# Patient Record
Sex: MALE | Race: BLACK OR AFRICAN AMERICAN | NOT HISPANIC OR LATINO | Employment: UNEMPLOYED | URBAN - METROPOLITAN AREA
[De-identification: names, ages, dates, MRNs, and addresses within clinical notes are randomized per-mention and may not be internally consistent; named-entity substitution may affect disease eponyms.]

---

## 2017-01-17 ENCOUNTER — OFFICE VISIT (OUTPATIENT)
Dept: ALLERGY | Facility: CLINIC | Age: 22
End: 2017-01-17
Payer: COMMERCIAL

## 2017-01-17 VITALS
SYSTOLIC BLOOD PRESSURE: 96 MMHG | DIASTOLIC BLOOD PRESSURE: 60 MMHG | TEMPERATURE: 97 F | BODY MASS INDEX: 23.25 KG/M2 | RESPIRATION RATE: 15 BRPM | HEART RATE: 74 BPM | HEIGHT: 67 IN | WEIGHT: 148.13 LBS

## 2017-01-17 DIAGNOSIS — J30.89 ALLERGIC RHINITIS DUE TO OTHER ALLERGEN: ICD-10-CM

## 2017-01-17 DIAGNOSIS — H69.93 EUSTACHIAN TUBE DYSFUNCTION, BILATERAL: ICD-10-CM

## 2017-01-17 DIAGNOSIS — J30.1 NON-SEASONAL ALLERGIC RHINITIS DUE TO POLLEN: Primary | ICD-10-CM

## 2017-01-17 PROCEDURE — 99203 OFFICE O/P NEW LOW 30 MIN: CPT | Mod: S$GLB,,, | Performed by: ALLERGY & IMMUNOLOGY

## 2017-01-17 PROCEDURE — 99999 PR PBB SHADOW E&M-EST. PATIENT-LVL III: CPT | Mod: PBBFAC,,, | Performed by: ALLERGY & IMMUNOLOGY

## 2017-01-17 PROCEDURE — 1159F MED LIST DOCD IN RCRD: CPT | Mod: S$GLB,,, | Performed by: ALLERGY & IMMUNOLOGY

## 2017-01-17 RX ORDER — MONTELUKAST SODIUM 10 MG/1
TABLET ORAL
Qty: 30 TABLET | Refills: 3 | Status: SHIPPED | OUTPATIENT
Start: 2017-01-17 | End: 2017-04-03

## 2017-01-17 NOTE — PROGRESS NOTES
Chief complaint: ALLERGIES    This note was dictated using voice recognition software and may contain errors.    History:    He is 21 years old.  He stated he has a lifelong history of perennial nasal symptoms which worsen episodically throughout the year.  He stated that he does tend to be more symptomatic in the winter months.  He had a 9 AM appointment on Tuesday, January 17, 2017.    In years past ALLERGY tests were performed by Dr. Fiore.  He was told he exhibited positive skin tests for smoke, house dust mites, and grass pollen.  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  He stated he received allergen immunotherapy injections for approximately 18 months.  He perceived no improvement in symptoms as a result of receiving treatment.  As a result, he stopped receiving immunotherapy.    His most troublesome symptoms include posterior rhinorrhea and nasal obstruction.  He experiences itching of the pharynx.  He also experiences symptoms suggestive of eustachian tube dysfunction bilaterally.    He does not believe that he is ALLERGIC to any animals or foods.    Past medical history he has no history of asthma nasal fractures nose or sinus surgery.  He has no history of heart liver or kidney thyroid disease hypertension or diabetes.  Information in his medical record regarding his past medical history family history and social history was reviewed and updated today.  Significant additions if any are as noted above or below.    Family history is negative for rhinitis and asthma.    When well, he takes no medicines on a daily basis.    Social history he is a student at [employer].  He needs to complete 2 semesters of school to graduate.  He is a marketing major.  Currently he lives in an apartment.  No animals or there.  Occasionally people smoke in the apartment.  The apartment has central heating and cooling.    Review of systems: At the time of his  appointment this morning he is in no acute distress.  No additional symptoms are mentioned.  See above.  He did state in the past when he used nasal sprays he thought that the irritated his.  He does not believe that he is ever taken Singulair.    Exam:    In general he is in no distress.  He is alert oriented well-developed in good mood and attentive  Gait steady  Skin no rash noted  Head no swelling noted  Eyes sclera white conjunctiva pink  Nose patent no polyps seen clear secretions pink mucosa  Ears not inflamed tympanic membranes not inflamed  Mouth no swelling or inflammation of the lips tongue or in the throat noted  Neck no masses or thyromegaly noted  Lymph nodes no significant cervical or epitrochlear lymphadenopathy noted  Lungs clear to auscultation  Heart regular rhythm no murmurs heard  Extremities no swelling or inflammation of the hands or legs noted    Impression:    #1 ALLERGIC rhinitis  #2 eustachian tube dysfunction    Assessment and plan:    I suggested he consider evaluating Singulair 10 mg 1 pill once a day.  In the past he is not evaluated this medicine.  This is agreeable with him.  At his request a prescription was issued and was transmitted electronically to his pharmacy.  He will take 1 pill once a day in the evening.  Potential side effects were discussed.  I requested that he call in 3 or 4 weeks and inform me as to whether or not Singulair was helpful.  If it is not helpful I told him alternate suggestions regarding treatment may be considered.    I did not recommend that immediate hypersensitivity skin tests be repeated today.  Should he continue to be symptomatic despite the use of appropriate medications consideration may be given to repeat evaluation.  His last evaluation occurred about 6 years ago.    He was given the office phone number.  Should he have additional questions or concerns he was instructed to call.

## 2017-01-17 NOTE — MR AVS SNAPSHOT
[facility]  [address]  [place] [address]  Phone: [phone]  Fax: [phone]                  Eladio Boone   2017 9:00 AM   Office Visit    Description:  Male : 1995   Provider:  Yon Domingo MD   Department:  [facility]           Reason for Visit     Allergic Rhinitis            Diagnoses this Visit        Comments    Non-seasonal allergic rhinitis due to pollen    -  Primary     Allergic rhinitis due to other allergen         Eustachian tube dysfunction, bilateral                To Do List           Goals (5 Years of Data)     None       These Medications        Disp Refills Start End    montelukast (SINGULAIR) 10 mg tablet 30 tablet 3 2017     1 pill orally every evening    Pharmacy: [facility]/pharmacy #[id number]  [address] AT [facility] #: [phone]         [facility] On Call     [facility] On Call Nurse Care Line -  Assistance  Registered nurses in the [facility] On Call Center provide clinical advisement, health education, appointment booking, and other advisory services.  Call for this free service at [phone].             Medications           Message regarding Medications     Verify the changes and/or additions to your medication regime listed below are the same as discussed with your clinician today.  If any of these changes or additions are incorrect, please notify your healthcare provider.        START taking these NEW medications        Refills    montelukast (SINGULAIR) 10 mg tablet 3    Si pill orally every evening    Class: Normal           Verify that the below list of medications is an accurate representation of the medications you are currently taking.  If none reported, the list may be blank. If incorrect, please contact your healthcare provider. Carry this list with you in case of emergency.           Current Medications     montelukast (SINGULAIR) 10 mg tablet 1 pill orally every  "evening           Clinical Reference Information           Vital Signs - Last Recorded  Most recent update: 1/17/2017  9:22 AM by Suzan Sanchez, KYLEN    Temp Ht Wt BMI       97.2 °F (36.2 °C) 5' 6.5" (1.689 m) 67.2 kg (148 lb 2.4 oz) 23.55 kg/m2       Allergies as of 1/17/2017     No Known Allergies      Immunizations Administered on Date of Encounter - 1/17/2017     None      Instructions    Per discussion.       "

## 2017-04-03 ENCOUNTER — OFFICE VISIT (OUTPATIENT)
Dept: ALLERGY | Facility: CLINIC | Age: 22
End: 2017-04-03
Payer: COMMERCIAL

## 2017-04-03 VITALS
RESPIRATION RATE: 15 BRPM | HEIGHT: 67 IN | TEMPERATURE: 97 F | SYSTOLIC BLOOD PRESSURE: 96 MMHG | BODY MASS INDEX: 23.6 KG/M2 | DIASTOLIC BLOOD PRESSURE: 60 MMHG | HEART RATE: 72 BPM | WEIGHT: 150.38 LBS

## 2017-04-03 DIAGNOSIS — J30.89 ALLERGIC RHINITIS DUE TO OTHER ALLERGEN: Primary | ICD-10-CM

## 2017-04-03 DIAGNOSIS — J30.1 NON-SEASONAL ALLERGIC RHINITIS DUE TO POLLEN: ICD-10-CM

## 2017-04-03 PROCEDURE — 99214 OFFICE O/P EST MOD 30 MIN: CPT | Mod: S$GLB,,, | Performed by: ALLERGY & IMMUNOLOGY

## 2017-04-03 PROCEDURE — 1160F RVW MEDS BY RX/DR IN RCRD: CPT | Mod: S$GLB,,, | Performed by: ALLERGY & IMMUNOLOGY

## 2017-04-03 PROCEDURE — 99999 PR PBB SHADOW E&M-EST. PATIENT-LVL III: CPT | Mod: PBBFAC,,, | Performed by: ALLERGY & IMMUNOLOGY

## 2017-04-03 RX ORDER — IPRATROPIUM BROMIDE 21 UG/1
SPRAY, METERED NASAL
Qty: 30 ML | Refills: 2 | Status: SHIPPED | OUTPATIENT
Start: 2017-04-03 | End: 2017-07-05

## 2017-04-03 NOTE — MR AVS SNAPSHOT
[facility]  [address] [place] [address]  Phone: [phone]  Fax: [phone]                  Eladio Boone   4/3/2017 9:00 AM   Office Visit    Description:  Male : 1995   Provider:  Yon Domingo MD   Department:  [facility]           Reason for Visit     Other     Allergic Rhinitis            Diagnoses this Visit        Comments    Allergic rhinitis due to other allergen    -  Primary     Non-seasonal allergic rhinitis due to pollen                To Do List           Goals (5 Years of Data)     None       These Medications        Disp Refills Start End    ipratropium (ATROVENT) 0.03 % nasal spray 30 mL 2 4/3/2017     1 or 2 sprays each nasal passage every 8 hours as directed, if needed    Pharmacy: [facility]/pharmacy #[id number] - [place], [place] [id number] [facility] #: [phone]         [facility] On Call     [facility] On Call Nurse Care Line -  Assistance  Unless otherwise directed by your provider, please contact [facility], our nurse care line that is available for  assistance.     Registered nurses in the [facility] On Call Center provide: appointment scheduling, clinical advisement, health education, and other advisory services.  Call: [phone] (toll free)               Medications           Message regarding Medications     Verify the changes and/or additions to your medication regime listed below are the same as discussed with your clinician today.  If any of these changes or additions are incorrect, please notify your healthcare provider.        START taking these NEW medications        Refills    ipratropium (ATROVENT) 0.03 % nasal spray 2    Si or 2 sprays each nasal passage every 8 hours as directed, if needed    Class: Normal           Verify that the below list of medications is an accurate representation of the medications you are currently taking.  If none reported, the list may be blank. If  "incorrect, please contact your healthcare provider. Carry this list with you in case of emergency.           Current Medications     ipratropium (ATROVENT) 0.03 % nasal spray 1 or 2 sprays each nasal passage every 8 hours as directed, if needed    montelukast (SINGULAIR) 10 mg tablet 1 pill orally every evening           Clinical Reference Information           Your Vitals Were     BP Pulse Temp Resp Height Weight    96/60 72 97.4 °F (36.3 °C) 15 5' 6.75" (1.695 m) 68.2 kg (150 lb 5.7 oz)    BMI                23.73 kg/m2          Blood Pressure          Most Recent Value    BP  96/60      Allergies as of 4/3/2017     No Known Allergies      Immunizations Administered on Date of Encounter - 4/3/2017     None      Instructions    Per discussion.       Language Assistance Services     ATTENTION: Language assistance services are available, free of charge. Please call [phone].      ATENCIÓN: Si habla español, tiene a rebolledo disposición servicios gratuitos de asistencia lingüística. Llame al [phone].     CHÚ Ý: N?u b?n nói Ti?ng Vi?t, có các d?ch v? h? tr? ngôn ng? mi?n phí dành cho b?n. G?i s? [phone].         [facility] complies with applicable Federal civil rights laws and does not discriminate on the basis of race, color, national origin, age, disability, or sex.        "

## 2017-04-03 NOTE — PROGRESS NOTES
Chief complaint: Respiratory symptoms, reevaluation.    This note was dictated using voice recognition software may contain errors.    History:    Please read and refer to my note dated January 17, 2017.  Information in this note was read and reviewed and updated today.  Significant additions if any are as noted below.    He did not find the use of Singulair to be helpful.    GE reflux is not a problem for him.  He stated that is most troublesome symptom is that of posterior rhinorrhea.    In the past he has not evaluated Atrovent nasal spray.    Information in his medical record regarding his past medical history family history and social history was reviewed and updated today.  Significant additions if any are as noted above or below.    Review of systems: He does not smoke.  He stated in recent weeks no one is been smoking indoors at his residence.  Posterior rhinorrhea is is most troublesome symptom.  He complained of the sensation of some irritation affecting the right side of the pharynx.    Exam:    In general he is in no distress.  He is alert oriented well-developed in good mood and attentive  Gait steady  Head no swelling noted  Skin no rash noted  Eyes sclera white conjunctiva pink  Nose patent no polyps seen  Ears not inflamed tympanic membranes not inflamed  Neck no thyromegaly or masses noted  Lymph nodes no significant cervical or epitrochlear lymphadenopathy noted  Lungs clear to auscultation  Heart no murmurs heard regular rhythm  Extremities no swelling or inflammation of the hands or legs noted.  Mouth no swelling or inflammation of the lips tongue or in throat noted.  No vesicles noted.  No exudate noted no thrush noted    Impression:    #1 ALLERGIC rhinitis  #2 chronic rhinitis  #3 other health concerns as noted in his medical record.  #4 throat discomfort    Assessment and plan:    Should his throat continued to be symptomatic I recommended he obtain ENT consultation for evaluation and  examination.    In view of the fact that posterior rhinorrhea is is most troublesome symptom I suggested he evaluate the use of Atrovent nasal spray 0.03%.  This is agreeable with him.  At his request a prescription was issued for this medication and was transmitted electronically to his pharmacy.    Using anatomical teaching models of the nose and had I reviewed anatomy with him.  He was instructed in the proper technique for using a nasal spray.    I suggested that he use Atrovent nasal spray 0.03% or 2 sprays in each nasal passage as often as every 8 hours if needed.    If Atrovent is not found to be helpful consideration may be given to recommending evaluation of Astelin nasal spray.    At this time I did not recommend that immediate hypersensitivity skin tests be repeated.    He was given the office phone number.  Should he have additional questions or concerns he was instructed to call.    His appointment was 25 minutes in duration spent entirely in face-to-face contact.  More than 50% of the visit was spent in counseling and coordination of care.

## 2017-07-05 ENCOUNTER — OFFICE VISIT (OUTPATIENT)
Dept: OTOLARYNGOLOGY | Facility: CLINIC | Age: 22
End: 2017-07-05
Payer: COMMERCIAL

## 2017-07-05 VITALS
RESPIRATION RATE: 18 BRPM | BODY MASS INDEX: 23.24 KG/M2 | HEIGHT: 66 IN | SYSTOLIC BLOOD PRESSURE: 114 MMHG | WEIGHT: 144.63 LBS | TEMPERATURE: 98 F | DIASTOLIC BLOOD PRESSURE: 78 MMHG

## 2017-07-05 DIAGNOSIS — J30.9 ALLERGIC RHINITIS, UNSPECIFIED ALLERGIC RHINITIS TRIGGER, UNSPECIFIED RHINITIS SEASONALITY: Primary | ICD-10-CM

## 2017-07-05 PROCEDURE — 99204 OFFICE O/P NEW MOD 45 MIN: CPT | Mod: S$GLB,,, | Performed by: PHYSICIAN ASSISTANT

## 2017-07-05 PROCEDURE — 99999 PR PBB SHADOW E&M-EST. PATIENT-LVL III: CPT | Mod: PBBFAC,,, | Performed by: PHYSICIAN ASSISTANT

## 2017-07-05 RX ORDER — AZELASTINE HYDROCHLORIDE, FLUTICASONE PROPIONATE 137; 50 UG/1; UG/1
1 SPRAY, METERED NASAL DAILY
Qty: 23 G | Refills: 12 | Status: SHIPPED | OUTPATIENT
Start: 2017-07-05 | End: 2018-07-05

## 2017-07-05 NOTE — PROGRESS NOTES
Subjective:       Patient ID: Eladio Boone is a 21 y.o. male.    Chief Complaint: Sore Throat; Cough; and Nasal Congestion    Patient is a very pleasant 21 year old male here to see me today for the first time for evaluation of postnasal drainage and sore throat.  He reports issues with allergies for many years.  He's had previous allergy testing and was on SCIT for about 18 months in 2012.  He reports no significant improvement in his allergy symptoms with SCIT therefore he stopped it.  Since then, he's not been on consistent allergy medication.  He reports winter usually seems worse for him.  His allergy triggers are dust, pollen and grass.  He reports postnasal drainage and throat irritation for the past 2-3 months.  At times he coughs up clear-yellowish mucus.  His throat and neck seems swollen at times.  He denies heartburn or reflux.  He's not blowing his nose much.  Denies frequent sneezing.  He occasionally has itchy, watery eyes.  He's not currently using any nasal sprays.  He has recently seen Dr. Domingo but says he did not try Atrovent nasal spray as prescribed and he saw no improvement with Singulair.  He does not smoke; quit about 6 months ago; smoked less than 1 pack per day for about 3 years.  He has a dog and there's carpet in his apartment.  Denies previous sinus surgery.  Denies fever.  Denies dysphagia with solids or liquids.      Review of Systems   Constitutional: Negative for activity change, appetite change and fever.   HENT: Positive for congestion (sometimes), postnasal drip and sore throat. Negative for ear discharge, ear pain, hearing loss, nosebleeds, rhinorrhea, sinus pressure, tinnitus, trouble swallowing and voice change.    Eyes: Positive for itching (occasional). Negative for discharge.   Respiratory: Positive for cough. Negative for shortness of breath and wheezing.    Cardiovascular: Negative for chest pain and palpitations.   Gastrointestinal: Positive for nausea (this AM).  Negative for diarrhea and vomiting.   Musculoskeletal: Negative for arthralgias and neck pain.   Allergic/Immunologic: Positive for environmental allergies. Negative for food allergies.   Neurological: Positive for headaches (occasional). Negative for dizziness and light-headedness.   Hematological: Negative for adenopathy.   Psychiatric/Behavioral: Negative for sleep disturbance.       Objective:      Physical Exam   Constitutional: He is oriented to person, place, and time. He appears well-developed and well-nourished. He is cooperative.   HENT:   Head: Normocephalic and atraumatic.   Right Ear: Hearing, tympanic membrane, external ear and ear canal normal. No middle ear effusion.   Left Ear: Hearing, tympanic membrane, external ear and ear canal normal.  No middle ear effusion.   Nose: Mucosal edema (mild) and septal deviation (left) present. No rhinorrhea or nasal deformity. No epistaxis. Right sinus exhibits no maxillary sinus tenderness and no frontal sinus tenderness. Left sinus exhibits no maxillary sinus tenderness and no frontal sinus tenderness.   Mouth/Throat: Uvula is midline, oropharynx is clear and moist and mucous membranes are normal. Mucous membranes are not pale and not dry. No trismus in the jaw. No uvula swelling or dental caries. No oropharyngeal exudate (clear postnasal drainage present), posterior oropharyngeal edema or posterior oropharyngeal erythema. Tonsils are 2+ on the right. Tonsils are 2+ on the left. No tonsillar exudate.   Eyes: Conjunctivae, EOM and lids are normal. Pupils are equal, round, and reactive to light. Right eye exhibits no chemosis. Left eye exhibits no chemosis. Right conjunctiva is not injected. Left conjunctiva is not injected. No scleral icterus. Right eye exhibits normal extraocular motion and no nystagmus. Left eye exhibits normal extraocular motion and no nystagmus.   Neck: Trachea normal and phonation normal. No tracheal tenderness present. No tracheal deviation  present. No thyroid mass and no thyromegaly present.   Cardiovascular: Intact distal pulses.    Pulmonary/Chest: Effort normal. No stridor. No respiratory distress.   Abdominal: He exhibits no distension.   Lymphadenopathy:        Head (right side): No submental, no submandibular, no preauricular and no posterior auricular adenopathy present.        Head (left side): No submental, no submandibular, no preauricular and no posterior auricular adenopathy present.     He has no cervical adenopathy.   Neurological: He is alert and oriented to person, place, and time. No cranial nerve deficit.   Skin: Skin is warm and dry. No rash noted. No erythema.   Psychiatric: He has a normal mood and affect. His speech is normal and behavior is normal.   Vitals reviewed.      Assessment:       1. Allergic rhinitis, unspecified allergic rhinitis trigger, unspecified rhinitis seasonality        Plan:         Recommend he restart either Zyrtec or Claritin on a daily basis.   He's tried Flonase in the past without significant benefit.   The patient was given a prescription for an antihistamine/steroid nasal spray (Dymista), and we discussed in detail the proper mechanism of use directing the spray away from the nasal septum.  In addition, we also discussed that it will take two to three weeks of daily use to achieve maximal effectiveness.  The patient will please call in 2-3 weeks with their progress.  If allergy symptoms persist at that time, we could consider additional allergy testing.  I reassured patient that his throat does not look infectious to me today and I do not palpate any cervical lymphadenopathy.  No need for antibiotics at this time.

## 2019-02-20 ENCOUNTER — OFFICE VISIT (OUTPATIENT)
Dept: PODIATRY | Facility: CLINIC | Age: 24
End: 2019-02-20
Payer: COMMERCIAL

## 2019-02-20 VITALS
WEIGHT: 166.56 LBS | DIASTOLIC BLOOD PRESSURE: 73 MMHG | HEART RATE: 74 BPM | BODY MASS INDEX: 26.88 KG/M2 | SYSTOLIC BLOOD PRESSURE: 139 MMHG

## 2019-02-20 DIAGNOSIS — B35.3 TINEA PEDIS OF LEFT FOOT: Primary | ICD-10-CM

## 2019-02-20 PROCEDURE — 99203 OFFICE O/P NEW LOW 30 MIN: CPT | Mod: S$GLB,,, | Performed by: PODIATRIST

## 2019-02-20 PROCEDURE — 99999 PR PBB SHADOW E&M-EST. PATIENT-LVL III: CPT | Mod: PBBFAC,,, | Performed by: PODIATRIST

## 2019-02-20 PROCEDURE — 3008F PR BODY MASS INDEX (BMI) DOCUMENTED: ICD-10-PCS | Mod: CPTII,S$GLB,, | Performed by: PODIATRIST

## 2019-02-20 PROCEDURE — 99999 PR PBB SHADOW E&M-EST. PATIENT-LVL III: ICD-10-PCS | Mod: PBBFAC,,, | Performed by: PODIATRIST

## 2019-02-20 PROCEDURE — 3008F BODY MASS INDEX DOCD: CPT | Mod: CPTII,S$GLB,, | Performed by: PODIATRIST

## 2019-02-20 PROCEDURE — 99203 PR OFFICE/OUTPT VISIT, NEW, LEVL III, 30-44 MIN: ICD-10-PCS | Mod: S$GLB,,, | Performed by: PODIATRIST

## 2019-02-20 RX ORDER — NAFTIFINE HYDROCHLORIDE 20 MG/G
GEL TOPICAL
Qty: 60 G | Refills: 2 | Status: SHIPPED | OUTPATIENT
Start: 2019-02-20 | End: 2019-03-22

## 2019-02-20 NOTE — PROGRESS NOTES
Subjective:       Patient ID: Eladio Boone is a 23 y.o. male.    Chief Complaint: Sore (sore on left foot; 4/10 pain level; no special footware; amulation with no assistance; non-diabetic; no PCP)    HPI: Eladio Boone presents to the clinic today with the chief complaint of persistent pruritus and burning to the left foot. Patient states these symptoms have been on going for several weeks to a month or so. Patient has tried OTC topical medications. States no prior evaluation by MD/DO/DPM. 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 States malodor to the foot at days end, and/or after wearing socks/shoes/sneakers/boots for several hours. States red, dry and flaky skin as well.     Review of patient's allergies indicates:  No Known Allergies    History reviewed. No pertinent past medical history.    Family History   Problem Relation Age of Onset    No Known Problems Mother     Hypertension Father     Colon cancer Neg Hx        Social History     Socioeconomic History    Marital status: Single     Spouse name: Not on file    Number of children: Not on file    Years of education: Not on file    Highest education level: Not on file   Social Needs    Financial resource strain: Not on file    Food insecurity - worry: Not on file    Food insecurity - inability: Not on file    Transportation needs - medical: Not on file    Transportation needs - non-medical: Not on file   Occupational History    Not on file   Tobacco Use    Smoking status: Former Smoker     Types: Cigarettes     Last attempt to quit: 2017     Years since quittin.1   Substance and Sexual Activity    Alcohol use: Yes     Comment: social use    Drug use: No    Sexual activity: Not on file   Other Topics Concern    Not on file   Social History Narrative    Single       Past Surgical History:   Procedure Laterality Date    WISDOM TOOTH EXTRACTION         Review of Systems   Constitutional: Negative for chills, fatigue and fever.    HENT: Negative for hearing loss.    Eyes: Negative for photophobia and visual disturbance.   Respiratory: Negative for cough, chest tightness, shortness of breath and wheezing.    Cardiovascular: Negative for chest pain and palpitations.   Gastrointestinal: Negative for constipation, diarrhea, nausea and vomiting.   Endocrine: Negative for cold intolerance and heat intolerance.   Genitourinary: Negative for flank pain.   Musculoskeletal: Negative for neck pain and neck stiffness.   Skin: Positive for rash and wound.   Neurological: Negative for light-headedness and headaches.   Psychiatric/Behavioral: Negative for sleep disturbance.          Objective:   /73   Pulse 74   Wt 75.5 kg (166 lb 8.9 oz)   BMI 26.88 kg/m²     X-Ray Abdomen Flat And Erect  Narrative: Comparison: None     Two views, 4 images    Findings:    Moderate air and feces within nondistended colon and rectum.  No abnormally distended air-filled bowel loop or free air. Lung bases are clear.  Minimal rotary scoliosis the otherwise unremarkable L. spine.  Focal calcification within the inferior aspect   right hemipelvis.  Impression:       1.  Nonspecific bowel pattern.  See above.    2.  Clinical and laboratory findings should guide further evaluation and/or follow-up imaging.       Electronically signed by: SHERMAN DOLL III, MD  Date:     01/28/15  Time:    13:58        Physical Exam    LOWER EXTREMITY PHYSICAL EXAMINATION  DERMATOLOGY:  Substantial flaky, erythematous, xerotic like lesions are noted to the left 4th webspace and plantar foot and digital sulcus.  As per the patient, the aforementioned areas are moderately pruritic.     NEUROLOGY: Sensation to light touch is intact. Proprioception is intact.    ORTHOPEDIC: Manual Muscle Testing is 5/5 in all planes on the left and right, without pains, with and without resistance. Gait pattern is non-antalgic.    VASCULAR: Pulses are palpable to the B/L lower extremity. The right dorsalis  pedis pulse is 2/4 and the posterior tibial pulse is 2/4. The left dorsalis pedis pulse is 2/4 and the posterior tibial pulse is 2/4. Hair growth is noted on the dorsal foot and digits. Proximal to distal, warm to warm. Capillary refill time is WNL at less than 3s.      Assessment:     1. Tinea pedis of left foot        Plan:     Tinea pedis of left foot    Other orders  -     naftifine 2 % Gel; Applied topically daily.  Dispense: 60 g; Refill: 2      Recommend moisture wicking socks to alleviate the hyperhidrosis which makes one prone to recurrent tinea pedis.  I also recommend to alternate shoe gear, meaning, Monday, Wednesday, Friday, Saturday/Sunday and Tuesday, Thursday, Saturday/Sunday.  I do also recommend Tinactin antifungal spray to shoes daily.  After the aforementioned, put the shoes aside and allow to dry overnight and/or one full day.  Patient may purchase OTC Gold Bond Powder and use to his/her shoes daily prior to putting them on. He/She may also sprinkle a slight amount of powder to the foot prior to putting on socks.        No future appointments.

## 2019-09-17 ENCOUNTER — HOSPITAL ENCOUNTER (OUTPATIENT)
Dept: RADIOLOGY | Facility: HOSPITAL | Age: 24
Discharge: HOME | End: 2019-09-17
Attending: INTERNAL MEDICINE
Payer: COMMERCIAL

## 2019-09-17 ENCOUNTER — OFFICE VISIT (OUTPATIENT)
Dept: INTERNAL MEDICINE | Facility: CLINIC | Age: 24
End: 2019-09-17
Payer: COMMERCIAL

## 2019-09-17 VITALS
RESPIRATION RATE: 18 BRPM | SYSTOLIC BLOOD PRESSURE: 130 MMHG | HEART RATE: 79 BPM | BODY MASS INDEX: 27.33 KG/M2 | WEIGHT: 169.31 LBS | DIASTOLIC BLOOD PRESSURE: 78 MMHG | TEMPERATURE: 96 F | OXYGEN SATURATION: 97 %

## 2019-09-17 DIAGNOSIS — M54.2 NECK PAIN: ICD-10-CM

## 2019-09-17 DIAGNOSIS — R53.83 FATIGUE, UNSPECIFIED TYPE: ICD-10-CM

## 2019-09-17 DIAGNOSIS — R20.2 ARM PARESTHESIA, LEFT: Primary | ICD-10-CM

## 2019-09-17 DIAGNOSIS — Z13.220 SCREENING FOR HYPERLIPIDEMIA: ICD-10-CM

## 2019-09-17 PROCEDURE — 3008F BODY MASS INDEX DOCD: CPT | Mod: CPTII,S$GLB,, | Performed by: INTERNAL MEDICINE

## 2019-09-17 PROCEDURE — 72040 XR CERVICAL SPINE AP LATERAL: ICD-10-PCS | Mod: 26,,, | Performed by: RADIOLOGY

## 2019-09-17 PROCEDURE — 99204 PR OFFICE/OUTPT VISIT, NEW, LEVL IV, 45-59 MIN: ICD-10-PCS | Mod: S$GLB,,, | Performed by: INTERNAL MEDICINE

## 2019-09-17 PROCEDURE — 72040 X-RAY EXAM NECK SPINE 2-3 VW: CPT | Mod: TC

## 2019-09-17 PROCEDURE — 3008F PR BODY MASS INDEX (BMI) DOCUMENTED: ICD-10-PCS | Mod: CPTII,S$GLB,, | Performed by: INTERNAL MEDICINE

## 2019-09-17 PROCEDURE — 99204 OFFICE O/P NEW MOD 45 MIN: CPT | Mod: S$GLB,,, | Performed by: INTERNAL MEDICINE

## 2019-09-17 PROCEDURE — 99999 PR PBB SHADOW E&M-EST. PATIENT-LVL III: ICD-10-PCS | Mod: PBBFAC,,, | Performed by: INTERNAL MEDICINE

## 2019-09-17 PROCEDURE — 72040 X-RAY EXAM NECK SPINE 2-3 VW: CPT | Mod: 26,,, | Performed by: RADIOLOGY

## 2019-09-17 PROCEDURE — 99999 PR PBB SHADOW E&M-EST. PATIENT-LVL III: CPT | Mod: PBBFAC,,, | Performed by: INTERNAL MEDICINE

## 2019-09-18 ENCOUNTER — LAB VISIT (OUTPATIENT)
Dept: LAB | Facility: HOSPITAL | Age: 24
End: 2019-09-18
Payer: COMMERCIAL

## 2019-09-18 DIAGNOSIS — R20.2 ARM PARESTHESIA, LEFT: ICD-10-CM

## 2019-09-18 DIAGNOSIS — R53.83 FATIGUE, UNSPECIFIED TYPE: ICD-10-CM

## 2019-09-18 DIAGNOSIS — Z13.220 SCREENING FOR HYPERLIPIDEMIA: ICD-10-CM

## 2019-09-18 LAB
BASOPHILS # BLD AUTO: 0.05 K/UL (ref 0–0.2)
BASOPHILS NFR BLD: 0.9 % (ref 0–1.9)
DIFFERENTIAL METHOD: NORMAL
EOSINOPHIL # BLD AUTO: 0.1 K/UL (ref 0–0.5)
EOSINOPHIL NFR BLD: 1.4 % (ref 0–8)
ERYTHROCYTE [DISTWIDTH] IN BLOOD BY AUTOMATED COUNT: 13.2 % (ref 11.5–14.5)
HCT VFR BLD AUTO: 46.6 % (ref 40–54)
HGB BLD-MCNC: 15.2 G/DL (ref 14–18)
IMM GRANULOCYTES # BLD AUTO: 0.01 K/UL (ref 0–0.04)
IMM GRANULOCYTES NFR BLD AUTO: 0.2 % (ref 0–0.5)
LYMPHOCYTES # BLD AUTO: 2.7 K/UL (ref 1–4.8)
LYMPHOCYTES NFR BLD: 47.6 % (ref 18–48)
MCH RBC QN AUTO: 28 PG (ref 27–31)
MCHC RBC AUTO-ENTMCNC: 32.6 G/DL (ref 32–36)
MCV RBC AUTO: 86 FL (ref 82–98)
MONOCYTES # BLD AUTO: 0.6 K/UL (ref 0.3–1)
MONOCYTES NFR BLD: 10.9 % (ref 4–15)
NEUTROPHILS # BLD AUTO: 2.2 K/UL (ref 1.8–7.7)
NEUTROPHILS NFR BLD: 39 % (ref 38–73)
NRBC BLD-RTO: 0 /100 WBC
PLATELET # BLD AUTO: 245 K/UL (ref 150–350)
PMV BLD AUTO: 10.4 FL (ref 9.2–12.9)
RBC # BLD AUTO: 5.42 M/UL (ref 4.6–6.2)
WBC # BLD AUTO: 5.71 K/UL (ref 3.9–12.7)

## 2019-09-18 PROCEDURE — 85025 COMPLETE CBC W/AUTO DIFF WBC: CPT

## 2019-09-18 PROCEDURE — 82607 VITAMIN B-12: CPT

## 2019-09-18 PROCEDURE — 84443 ASSAY THYROID STIM HORMONE: CPT

## 2019-09-18 PROCEDURE — 36415 COLL VENOUS BLD VENIPUNCTURE: CPT

## 2019-09-18 PROCEDURE — 80061 LIPID PANEL: CPT

## 2019-09-18 PROCEDURE — 80053 COMPREHEN METABOLIC PANEL: CPT

## 2019-09-18 NOTE — PROGRESS NOTES
Subjective:       Patient ID: Eladio Boone is a 23 y.o. male.    Chief Complaint: Numbness (left arm/chest pain/neck pain)    23 y.o. Black or  male     Patient presents with:  Numbness: left arm/chest pain/neck pain    HPI: Presents to the clinic with compliant of occasional left arm numbness for more than a year. He does report neck pain that has been going on for a long time. The pain occasionally goes into his left upper chest. He feels stiff. He does feel he has anxiety at times and that makes the symptoms worse. He has trouble sleeping at times due to his symptoms.   He has also been experiencing a lot of fatigue.   He denies a significant past medical history and he is not on any medications.     History reviewed. No pertinent past medical history.    Past Surgical History:  WISDOM TOOTH EXTRACTION    Review of patient's family history indicates:  Problem: No Known Problems      Relation: Mother          Age of Onset: (Not Specified)  Problem: Hypertension      Relation: Father          Age of Onset: (Not Specified)  Problem: Stroke      Relation: Maternal Grandfather          Age of Onset: (Not Specified)  Problem: Colon cancer      Relation: Neg Hx          Age of Onset: (Not Specified)    No current outpatient medications on file prior to visit.  No current facility-administered medications on file prior to visit.     Allergies:   Review of patient's allergies indicates:   -- Pollen extracts             Review of Systems   Constitutional: Positive for fatigue. Negative for fever and unexpected weight change.   Eyes: Negative for visual disturbance.   Respiratory: Negative for cough and shortness of breath.    Cardiovascular: Positive for chest pain. Negative for leg swelling.   Gastrointestinal: Negative for abdominal pain.   Genitourinary: Negative for difficulty urinating.   Musculoskeletal: Positive for arthralgias, back pain and neck pain. Negative for gait problem.   Neurological:  Positive for weakness and numbness. Negative for dizziness, syncope and headaches.   Psychiatric/Behavioral: Positive for sleep disturbance. The patient is nervous/anxious.        Objective:      Physical Exam   Constitutional: He is oriented to person, place, and time. He appears well-developed and well-nourished. No distress.   Eyes: Pupils are equal, round, and reactive to light. EOM are normal. No scleral icterus.   Neck: No spinous process tenderness and no muscular tenderness present. No neck rigidity. No tracheal deviation, no edema, no erythema and normal range of motion present. No thyromegaly present.   Cardiovascular: Normal rate, regular rhythm and normal heart sounds.   Pulmonary/Chest: Effort normal and breath sounds normal. No respiratory distress. He has no wheezes. He has no rales.   Abdominal: Soft. Bowel sounds are normal.   Musculoskeletal: He exhibits no edema.   Lymphadenopathy:     He has no cervical adenopathy.   Neurological: He is alert and oriented to person, place, and time. He has normal strength. He displays no atrophy. A sensory deficit (decreased in LUE) is present. He exhibits normal muscle tone. Gait normal.   Vitals reviewed.      Assessment:       1. Arm paresthesia, left    2. Neck pain    3. Fatigue, unspecified type    4. Screening for hyperlipidemia        Plan:       Eladio was seen today for numbness.    Diagnoses and all orders for this visit:    Arm paresthesia, left  -     CBC auto differential; Future  -     Comprehensive metabolic panel; Future  -     Vitamin B12; Future    Neck pain  -     X-Ray Cervical Spine AP And Lateral; Future    Fatigue, unspecified type  -     CBC auto differential; Future  -     Comprehensive metabolic panel; Future  -     TSH; Future    Screening for hyperlipidemia  -     Lipid panel; Future    X-ray today.     Schedule labs.

## 2019-09-19 LAB
ALBUMIN SERPL BCP-MCNC: 4.4 G/DL (ref 3.5–5.2)
ALP SERPL-CCNC: 94 U/L (ref 55–135)
ALT SERPL W/O P-5'-P-CCNC: 39 U/L (ref 10–44)
ANION GAP SERPL CALC-SCNC: 8 MMOL/L (ref 8–16)
AST SERPL-CCNC: 23 U/L (ref 10–40)
BILIRUB SERPL-MCNC: 0.7 MG/DL (ref 0.1–1)
BUN SERPL-MCNC: 10 MG/DL (ref 6–20)
CALCIUM SERPL-MCNC: 9.6 MG/DL (ref 8.7–10.5)
CHLORIDE SERPL-SCNC: 103 MMOL/L (ref 95–110)
CHOLEST SERPL-MCNC: 157 MG/DL (ref 120–199)
CHOLEST/HDLC SERPL: 2.8 {RATIO} (ref 2–5)
CO2 SERPL-SCNC: 27 MMOL/L (ref 23–29)
CREAT SERPL-MCNC: 1 MG/DL (ref 0.5–1.4)
EST. GFR  (AFRICAN AMERICAN): >60 ML/MIN/1.73 M^2
EST. GFR  (NON AFRICAN AMERICAN): >60 ML/MIN/1.73 M^2
GLUCOSE SERPL-MCNC: 79 MG/DL (ref 70–110)
HDLC SERPL-MCNC: 56 MG/DL (ref 40–75)
HDLC SERPL: 35.7 % (ref 20–50)
LDLC SERPL CALC-MCNC: 93.8 MG/DL (ref 63–159)
NONHDLC SERPL-MCNC: 101 MG/DL
POTASSIUM SERPL-SCNC: 4 MMOL/L (ref 3.5–5.1)
PROT SERPL-MCNC: 7.7 G/DL (ref 6–8.4)
SODIUM SERPL-SCNC: 138 MMOL/L (ref 136–145)
TRIGL SERPL-MCNC: 36 MG/DL (ref 30–150)
TSH SERPL DL<=0.005 MIU/L-ACNC: 0.73 UIU/ML (ref 0.4–4)
VIT B12 SERPL-MCNC: 483 PG/ML (ref 210–950)

## 2019-10-25 ENCOUNTER — OFFICE VISIT (OUTPATIENT)
Dept: PODIATRY | Facility: CLINIC | Age: 24
End: 2019-10-25
Payer: COMMERCIAL

## 2019-10-25 VITALS
WEIGHT: 173.75 LBS | BODY MASS INDEX: 27.92 KG/M2 | DIASTOLIC BLOOD PRESSURE: 60 MMHG | SYSTOLIC BLOOD PRESSURE: 130 MMHG | HEIGHT: 66 IN

## 2019-10-25 DIAGNOSIS — B35.3 TINEA PEDIS OF LEFT FOOT: Primary | ICD-10-CM

## 2019-10-25 PROCEDURE — 99214 PR OFFICE/OUTPT VISIT, EST, LEVL IV, 30-39 MIN: ICD-10-PCS | Mod: S$GLB,,, | Performed by: PODIATRIST

## 2019-10-25 PROCEDURE — 3008F PR BODY MASS INDEX (BMI) DOCUMENTED: ICD-10-PCS | Mod: CPTII,S$GLB,, | Performed by: PODIATRIST

## 2019-10-25 PROCEDURE — 99214 OFFICE O/P EST MOD 30 MIN: CPT | Mod: S$GLB,,, | Performed by: PODIATRIST

## 2019-10-25 PROCEDURE — 99999 PR PBB SHADOW E&M-EST. PATIENT-LVL II: ICD-10-PCS | Mod: PBBFAC,,, | Performed by: PODIATRIST

## 2019-10-25 PROCEDURE — 3008F BODY MASS INDEX DOCD: CPT | Mod: CPTII,S$GLB,, | Performed by: PODIATRIST

## 2019-10-25 PROCEDURE — 99999 PR PBB SHADOW E&M-EST. PATIENT-LVL II: CPT | Mod: PBBFAC,,, | Performed by: PODIATRIST

## 2019-10-25 RX ORDER — NAFTIFINE HYDROCHLORIDE 20 MG/G
GEL TOPICAL
Qty: 45 G | Refills: 1 | Status: SHIPPED | OUTPATIENT
Start: 2019-10-25

## 2019-10-25 NOTE — PROGRESS NOTES
Subjective:       Patient ID: Eladio Boone is a 23 y.o. male.    Chief Complaint: Follow-up (Pt, c/o fungus still on his L. feet last toe between the middle on the toe,rates pain 4/10, tennis w/socks, non diabetic, PCP dont have one.)    HPI: Eladio Boone presents to the clinic today with the chief complaint of persistent pruritus and burning to the left foot. Patient states these symptoms have been on going for several weeks to a month or so. Patient was prescribed topical Naftin 2% gel earlier this year.  He states near complete resolution without medication, but since he has run out, the symptoms have returned. 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 States malodor to the foot at days end, and/or after wearing socks/shoes/sneakers/boots for several hours. States red, dry and flaky skin as well.      Review of patient's allergies indicates:   Allergen Reactions    Pollen extracts        History reviewed. No pertinent past medical history.    Family History   Problem Relation Age of Onset    No Known Problems Mother     Hypertension Father     Stroke Maternal Grandfather     Colon cancer Neg Hx        Social History     Socioeconomic History    Marital status: Single     Spouse name: Not on file    Number of children: Not on file    Years of education: Not on file    Highest education level: Not on file   Occupational History    Not on file   Social Needs    Financial resource strain: Not on file    Food insecurity:     Worry: Not on file     Inability: Not on file    Transportation needs:     Medical: Not on file     Non-medical: Not on file   Tobacco Use    Smoking status: Former Smoker     Types: Cigarettes     Last attempt to quit: 2017     Years since quittin.8   Substance and Sexual Activity    Alcohol use: Yes     Comment: social use    Drug use: Yes     Types: Marijuana    Sexual activity: Not on file   Lifestyle    Physical activity:     Days per week: Not on file      "Minutes per session: Not on file    Stress: Not on file   Relationships    Social connections:     Talks on phone: Not on file     Gets together: Not on file     Attends [employer] service: Not on file     Active member of club or organization: Not on file     Attends meetings of clubs or organizations: Not on file     Relationship status: Not on file   Other Topics Concern    Not on file   Social History Narrative    Single       Past Surgical History:   Procedure Laterality Date    WISDOM TOOTH EXTRACTION         Review of Systems   Constitutional: Negative for chills, fatigue and fever.   HENT: Negative for hearing loss.    Eyes: Negative for photophobia and visual disturbance.   Respiratory: Negative for cough, chest tightness, shortness of breath and wheezing.    Cardiovascular: Negative for chest pain and palpitations.   Gastrointestinal: Negative for constipation, diarrhea, nausea and vomiting.   Endocrine: Negative for cold intolerance and heat intolerance.   Genitourinary: Negative for flank pain.   Musculoskeletal: Negative for neck pain and neck stiffness.   Skin: Positive for rash.   Neurological: Negative for light-headedness and headaches.   Psychiatric/Behavioral: Negative for sleep disturbance.          Objective:   /60 (BP Location: Right arm, Patient Position: Sitting, BP Method: Large (Automatic))   Ht 5' 6" (1.676 m)   Wt 78.8 kg (173 lb 11.6 oz)   BMI 28.04 kg/m²          Physical Exam    LOWER EXTREMITY PHYSICAL EXAMINATION  DERMATOLOGY:  Substantial flaky, erythematous, xerotic like lesions are noted to the left 4th webspace.  As per the patient, the aforementioned areas are moderately pruritic.     NEUROLOGY: Sensation to light touch is intact. Proprioception is intact.    ORTHOPEDIC: Manual Muscle Testing is 5/5 in all planes on the left and right, without pains, with and without resistance. Gait pattern is non-antalgic.    VASCULAR: Pulses are palpable to the B/L lower " extremity. The right dorsalis pedis pulse is 2/4 and the posterior tibial pulse is 2/4. The left dorsalis pedis pulse is 2/4 and the posterior tibial pulse is 2/4. Hair growth is noted on the dorsal foot and digits. Proximal to distal, warm to warm. Capillary refill time is WNL at less than 3s.      Assessment:     1. Tinea pedis of left foot        Plan:     Tinea pedis of left foot  -     naftifine 2 % Gel; Apply a thin layer once daily to affected area and surrounding skin (1/2 inch margin).  Dispense: 45 g; Refill: 1        Recommend moisture wicking socks to alleviate the hyperhidrosis which makes one prone to recurrent tinea pedis.  I also recommend to alternate shoe gear, meaning, Monday, Wednesday, Friday, Saturday/Sunday and Tuesday, Thursday, Saturday/Sunday.  I do also recommend Tinactin antifungal spray to shoes daily.  After the aforementioned, put the shoes aside and allow to dry overnight and/or one full day.  Patient may purchase OTC Gold Bond Powder and use to his/her shoes daily prior to putting them on. He/She may also sprinkle a slight amount of powder to the foot prior to putting on socks. Rec. patient to purchase OTC Gentian Violet and use once every other day.           Future Appointments   Date Time Provider Department Center   11/8/2019  1:40 PM Phillip Hendrickson DPM [facility]

## 2025-07-23 ENCOUNTER — OFFICE VISIT (OUTPATIENT)
Dept: SLEEP MEDICINE | Facility: CLINIC | Age: 30
End: 2025-07-23
Payer: MEDICAID

## 2025-07-23 VITALS
DIASTOLIC BLOOD PRESSURE: 86 MMHG | SYSTOLIC BLOOD PRESSURE: 130 MMHG | OXYGEN SATURATION: 99 % | HEIGHT: 66 IN | WEIGHT: 180.31 LBS | BODY MASS INDEX: 28.98 KG/M2 | HEART RATE: 65 BPM | RESPIRATION RATE: 22 BRPM

## 2025-07-23 DIAGNOSIS — G47.30 SLEEP DISORDER BREATHING: Primary | ICD-10-CM

## 2025-07-23 DIAGNOSIS — G47.00 INSOMNIA, UNSPECIFIED TYPE: ICD-10-CM

## 2025-07-23 PROCEDURE — 1160F RVW MEDS BY RX/DR IN RCRD: CPT | Mod: CPTII,,, | Performed by: NURSE PRACTITIONER

## 2025-07-23 PROCEDURE — 99204 OFFICE O/P NEW MOD 45 MIN: CPT | Mod: S$PBB,,, | Performed by: NURSE PRACTITIONER

## 2025-07-23 PROCEDURE — 3075F SYST BP GE 130 - 139MM HG: CPT | Mod: CPTII,,, | Performed by: NURSE PRACTITIONER

## 2025-07-23 PROCEDURE — 3008F BODY MASS INDEX DOCD: CPT | Mod: CPTII,,, | Performed by: NURSE PRACTITIONER

## 2025-07-23 PROCEDURE — 99204 OFFICE O/P NEW MOD 45 MIN: CPT | Mod: PBBFAC | Performed by: NURSE PRACTITIONER

## 2025-07-23 PROCEDURE — 1159F MED LIST DOCD IN RCRD: CPT | Mod: CPTII,,, | Performed by: NURSE PRACTITIONER

## 2025-07-23 PROCEDURE — 99999 PR PBB SHADOW E&M-NEW PATIENT-LVL IV: CPT | Mod: PBBFAC,,, | Performed by: NURSE PRACTITIONER

## 2025-07-23 PROCEDURE — 3079F DIAST BP 80-89 MM HG: CPT | Mod: CPTII,,, | Performed by: NURSE PRACTITIONER

## 2025-07-23 RX ORDER — TRAZODONE HYDROCHLORIDE 50 MG/1
50 TABLET ORAL NIGHTLY
Qty: 15 TABLET | Refills: 3 | Status: SHIPPED | OUTPATIENT
Start: 2025-07-23

## 2025-07-23 NOTE — PATIENT INSTRUCTIONS
"Stimulus control -- Stimulus control therapy is based on the idea that some people with insomnia have learned to associate the bedroom with staying awake rather than sleeping.  ?You should spend no more than 20 minutes lying in bed trying to fall asleep.  ?If you cannot fall asleep within 20 minutes, get up, go to another room and read or find another relaxing activity until you feel sleepy again. Activities such as eating, balancing your checkbook, doing housework, watching TV, or studying for a test, which "reward" you for staying awake, should be avoided.  ?When you start to feel sleepy, you can return to bed. If you cannot fall asleep in another 20 minutes, repeat the process.  ?Set an alarm clock and get up at the same time every day, including weekends.  ?Do not take a nap during the day.  You may not sleep much on the first night. However, sleep is more likely on succeeding nights because sleepiness is increased and naps are not allowed.   "

## 2025-07-23 NOTE — PROGRESS NOTES
Patient ID: Eladio Boone is a 29 y.o. male.    Chief Complaint: Sleep Apnea      Subjective:      History of Present Illness    HPI:  New patient. Mr. Boone reports a long-standing history of insomnia, affecting both sleep initiation and maintenance. He typically attempts to go to bed around 10-11 PM but often wakes up around 2-3 AM and struggles to fall back asleep. He has tried OTC sleep aids like ZzzQuil, which initially helped but have become less effective over time. Even minor disruptions to his routine can prevent him from falling asleep.    When unable to sleep, he often remains in bed or sometimes gets up to eat a snack, believing it might help him fall back asleep. He usually avoids using his phone or watching TV during these times, recognizing these activities can keep him awake. He maintains a consistent wake-up time of 6-7 AM, regardless of his sleep quality, to attend the gym.    He reports feeling tired during the day but struggles to nap, describing a sensation of physical fatigue combined with mental alertness that prevents sleep. He works from 12 PM to 8 PM, approximately 30 hours per week, with some days working 12 PM to 4 PM. He has tried melatonin supplements (5-10 mg) without success.    The insomnia is affecting his physical performance, as he notices increased fatigue after a few weeks of poor sleep, which impacts his gym routine. He recalls a recent severe episode where he could not fall asleep until 7 AM, which left him feeling particularly fatigued throughout the following week.    He reports a history of snoring, which his girlfriend has confirmed. He sleeps with his mouth open due to allergies. He typically wakes up with a dry mouth and drinks water first thing in the morning.    He denies falling asleep during the day or having sleep apnea symptoms.    SOCIAL HISTORY:  Occupation: Works 12:00 to 8:00 PM, about 30 hours a week    Marital status: Has a girlfriend     STOP - BANG  "Questionnaire:  Questions were asked by me and answered by patient on today's visit      1. Snoring : Do you snore loudly ?    Yes    2. Tired : Do you often feel tired, fatigued, or sleepy during daytime? Yes    3. Observed: Has anyone observed you stop breathing during your sleep?   No     4. Blood pressure : Do you have or are you being treated for high blood pressure?   No    5. BMI :BMI more than 35 kg/m2?   No    6. Age : Age over 50 yr old?   No    7. Neck circumference: Neck circumference greater than 40 cm?   No    8. Gender: Gender male?   Yes    High risk of EVE: Yes 5 - 8  Intermediate risk of EVE: Yes 3 - 4  Low risk of EVE: Yes 0 - 2      References:   STOP Questionnaire   A Tool to Screen Patients for Obstructive Sleep Apnea: BRIAN Vargas.C.P.C., VLADISLAV Laughlin.B.B.S., Jeanne Camacho M.D.,Mary Shi, Ph.D., VLADISLAV Acosta.B.B.S.,_ VLADISLAV Levi.Sc.,_ Xiomara Briceno M.D., Amaury Boss F.R.C.P.C.; Anesthesiology 2008; 108:812-21 Copyright © 2008, the American Society of Anesthesiologists, Inc. [employer]           Problem List[1]  /86   Pulse 65   Resp (!) 22   Ht 5' 6" (1.676 m)   Wt 81.8 kg (180 lb 5.4 oz)   SpO2 99%   BMI 29.11 kg/m²   Body mass index is 29.11 kg/m².    Review of Systems   Constitutional:  Positive for fatigue.   HENT:  Positive for congestion.    Respiratory:  Positive for snoring and somnolence.    Psychiatric/Behavioral:  Positive for sleep disturbance.        Objective:      Physical Exam  Constitutional:       Appearance: Normal appearance. He is well-developed.   HENT:      Head: Normocephalic and atraumatic.      Nose: Nose normal.   Eyes:      General: Lids are normal.   Pulmonary:      Effort: Pulmonary effort is normal. No tachypnea, bradypnea, accessory muscle usage or respiratory distress.   Musculoskeletal:      Cervical back: Normal range of motion.   Skin:     Findings: No rash.   Neurological:      " Mental Status: He is alert and oriented to person, place, and time.   Psychiatric:         Behavior: Behavior normal.         Thought Content: Thought content normal.         Judgment: Judgment normal.       Personal Diagnostic Review      7/23/2025    12:47 PM   EPWORTH SLEEPINESS SCALE   Sitting and reading 0   Watching TV 0   Sitting, inactive in a public place (e.g. a theatre or a meeting) 0   As a passenger in a car for an hour without a break 0   Lying down to rest in the afternoon when circumstances permit 1   Sitting and talking to someone 0   Sitting quietly after a lunch without alcohol 1   In a car, while stopped for a few minutes in traffic 0   Total score 2          Assessment:       1. Sleep disorder breathing    2. Insomnia, unspecified type        Encounter Medications[2]  Orders Placed This Encounter   Procedures    Home Sleep Study     Standing Status:   Future     Expiration Date:   7/23/2026     Plan:       1. Sleep disorder breathing  -     Home Sleep Study; Future    2. Insomnia, unspecified type  -     traZODone (DESYREL) 50 MG tablet; Take 1 tablet (50 mg total) by mouth every evening.  Dispense: 15 tablet; Refill: 3       Assessment & Plan    IMPRESSION:  - Considered and ordered home sleep study to evaluate for potential sleep apnea, given reported snoring and difficulty staying asleep, pending insurance approval.Intermediate risk of EVE on STOP BANG    INSOMNIA:  - Educated on sleep hygiene practices, including avoiding electronics before bedtime and not watching TV in the bedroom.  - Mr. Bojorquezy to leave the bedroom if unable to fall back asleep after waking, but avoid phone or TV use; instead, engage in low-stimulation activities like reading a magazine under dim light.  -  Weary to avoid eating when waking up in the middle of the night.  -  Weary to maintain consistent sleep schedule, allowing for 8 hours of sleep.  - Started trazodone 50 mg at bedtime for approximately 2 weeks to  help reset sleep rhythm and break current sleep pattern. If lingering effects occur, patient instructed to reduce dose to half a pill. Discussed importance of allowing full 8 hours for sleep when taking it.    SLEEP APNEA:  - Considered and ordered home sleep study to evaluate for potential sleep apnea, given reported snoring and difficulty staying asleep, pending insurance approval.  - Contact the office regarding sleep study once approved by insurance.    FOLLOW-UP:  - Follow up in approximately 3 months.  - Follow up earlier after sleep study results are available.          This note was generated with the assistance of ambient listening technology. Verbal consent was obtained by the patient and accompanying visitor(s) for the recording of patient appointment to facilitate this note. I attest to having reviewed and edited the generated note for accuracy, though some syntax or spelling errors may persist. Please contact the author of this note for any clarification.                Elizabeth LeJeune, ACNP, ANP       [1]   Patient Active Problem List  Diagnosis    Allergic rhinitis due to other allergen   [2]   Outpatient Encounter Medications as of 7/23/2025   Medication Sig Dispense Refill    naftifine 2 % Gel Apply a thin layer once daily to affected area and surrounding skin (1/2 inch margin). (Patient not taking: Reported on 7/23/2025) 45 g 1    traZODone (DESYREL) 50 MG tablet Take 1 tablet (50 mg total) by mouth every evening. 15 tablet 3     No facility-administered encounter medications on file as of 7/23/2025.

## 2025-07-25 ENCOUNTER — TELEPHONE (OUTPATIENT)
Dept: SLEEP MEDICINE | Facility: CLINIC | Age: 30
End: 2025-07-25
Payer: MEDICAID